# Patient Record
Sex: FEMALE | Race: WHITE | Employment: FULL TIME | URBAN - METROPOLITAN AREA
[De-identification: names, ages, dates, MRNs, and addresses within clinical notes are randomized per-mention and may not be internally consistent; named-entity substitution may affect disease eponyms.]

---

## 2019-07-29 ENCOUNTER — OFFICE VISIT (OUTPATIENT)
Dept: SURGERY | Age: 34
End: 2019-07-29

## 2019-07-29 VITALS
OXYGEN SATURATION: 99 % | HEART RATE: 89 BPM | WEIGHT: 218 LBS | BODY MASS INDEX: 40.12 KG/M2 | HEIGHT: 62 IN | SYSTOLIC BLOOD PRESSURE: 133 MMHG | DIASTOLIC BLOOD PRESSURE: 83 MMHG | TEMPERATURE: 97.6 F | RESPIRATION RATE: 16 BRPM

## 2019-07-29 DIAGNOSIS — I10 HYPERTENSION, UNSPECIFIED TYPE: ICD-10-CM

## 2019-07-29 DIAGNOSIS — Z96.41 INSULIN PUMP IN PLACE: ICD-10-CM

## 2019-07-29 DIAGNOSIS — J45.909 ASTHMA, UNSPECIFIED ASTHMA SEVERITY, UNSPECIFIED WHETHER COMPLICATED, UNSPECIFIED WHETHER PERSISTENT: ICD-10-CM

## 2019-07-29 DIAGNOSIS — E66.01 SEVERE OBESITY (BMI 35.0-39.9) WITH COMORBIDITY (HCC): ICD-10-CM

## 2019-07-29 DIAGNOSIS — K21.9 GASTROESOPHAGEAL REFLUX DISEASE, ESOPHAGITIS PRESENCE NOT SPECIFIED: ICD-10-CM

## 2019-07-29 DIAGNOSIS — M25.50 ARTHRALGIA, UNSPECIFIED JOINT: ICD-10-CM

## 2019-07-29 DIAGNOSIS — E10.9 TYPE 1 DIABETES MELLITUS WITHOUT COMPLICATION (HCC): ICD-10-CM

## 2019-07-29 DIAGNOSIS — Z87.891 SMOKING HISTORY: ICD-10-CM

## 2019-07-29 DIAGNOSIS — E66.01 SEVERE OBESITY (BMI 35.0-39.9) WITH COMORBIDITY (HCC): Primary | ICD-10-CM

## 2019-07-29 RX ORDER — OMEPRAZOLE 40 MG/1
40 CAPSULE, DELAYED RELEASE ORAL
COMMUNITY
Start: 2019-06-21 | End: 2020-06-20

## 2019-07-29 RX ORDER — ALBUTEROL SULFATE 2.5 MG/.5ML
2.5 SOLUTION RESPIRATORY (INHALATION)
COMMUNITY

## 2019-07-29 RX ORDER — NAPROXEN 500 MG/1
TABLET ORAL
Refills: 3 | COMMUNITY
Start: 2019-07-15

## 2019-07-29 RX ORDER — NYSTATIN AND TRIAMCINOLONE ACETONIDE 100000; 1 [USP'U]/G; MG/G
OINTMENT TOPICAL
COMMUNITY
Start: 2019-07-27

## 2019-07-29 RX ORDER — BLOOD SUGAR DIAGNOSTIC
STRIP MISCELLANEOUS
Refills: 6 | COMMUNITY
Start: 2019-06-07

## 2019-07-29 RX ORDER — SULFAMETHOXAZOLE AND TRIMETHOPRIM 800; 160 MG/1; MG/1
TABLET ORAL
COMMUNITY
Start: 2019-07-27

## 2019-07-29 RX ORDER — INSULIN ASPART 100 [IU]/ML
INJECTION, SOLUTION INTRAVENOUS; SUBCUTANEOUS
COMMUNITY

## 2019-07-29 RX ORDER — TIZANIDINE 2 MG/1
2 TABLET ORAL
COMMUNITY
Start: 2019-06-27

## 2019-07-29 RX ORDER — LANCETS 33 GAUGE
EACH MISCELLANEOUS
Refills: 6 | COMMUNITY
Start: 2019-05-20

## 2019-07-29 RX ORDER — VALSARTAN 80 MG/1
80 TABLET ORAL
COMMUNITY
Start: 2019-05-20

## 2019-07-29 RX ORDER — ONDANSETRON 4 MG/1
TABLET, ORALLY DISINTEGRATING ORAL
COMMUNITY
Start: 2019-05-23

## 2019-07-29 NOTE — PROGRESS NOTES
Osmar John presents today for   Chief Complaint   Patient presents with    New Patient     Pt is a New Patient       Is someone accompanying this pt? no    Is the patient using any DME equipment during OV? no    Depression Screening:  3 most recent PHQ Screens 7/29/2019   Little interest or pleasure in doing things Not at all   Feeling down, depressed, irritable, or hopeless Not at all   Total Score PHQ 2 0       Learning Assessment:  Learning Assessment 7/29/2019   PRIMARY LEARNER Patient   HIGHEST LEVEL OF EDUCATION - PRIMARY LEARNER  [address] [place] [address] PRIMARY LEARNER NONE   CO-LEARNER CAREGIVER No   PRIMARY LANGUAGE ENGLISH    NEED No   LEARNER PREFERENCE PRIMARY DEMONSTRATION   LEARNING SPECIAL TOPICS no   ANSWERED BY patient   RELATIONSHIP SELF       Abuse Screening:  Abuse Screening Questionnaire 7/29/2019   Do you ever feel afraid of your partner? N   Are you in a relationship with someone who physically or mentally threatens you? N   Is it safe for you to go home? Y       Fall Risk  No flowsheet data found. Coordination of Care:  1. Have you been to the ER, urgent care clinic since your last visit? Hospitalized since your last visit?no    2. Have you seen or consulted any other health care providers outside of the [address] [place] [address] since your last visit? Include any pap smears or colon screening.  no

## 2019-07-29 NOTE — PROGRESS NOTES
Bariatric Surgery Consultation    Subjective: The patient is a 29 y.o. obese female with a Body mass index is 39.87 kg/m². .  The patient is currently her heaviest weight for the past several years. she has been overweight since her teen years. she has been considering surgery since last year. she desires surgery at this time because of multiple health concerns and their lifestyle issues which are hindered by their weight. she has been referred by his family physician for evaluation and treatment of their obesity via surgical intervention. Mila Lam has tried multiple diets in her lifetime most recently tried behavior modification and unsupervised diets    Bariatric comorbidities present are   Patient Active Problem List   Diagnosis Code    Severe obesity (BMI 35.0-39. 9) with comorbidity ([address] E66.01    Diabetes mellitus ([address] E11.9    Asthma J45.909    GERD (gastroesophageal reflux disease) K21.9    Arthritic-like pain M25.50    Hypertension I10    Type 1 diabetes ([address] E10.9    Smoking history Z87.891    Insulin pump in place Z96.41       The patient is considering laparoscopic sleeve gastrectomy for surgical weight loss due to their ineffective progress with medical forms of weight loss and the urging of their physician who cares for their primary medical issues. The patient  now presents  for consideration for weight loss surgery understanding the benefits of this over a medical approach of weight loss as was discussed in our presentation on weight loss surgery. They have discussed their plans both with their family and primary care physician who is in support of their pursuit of such. The patient has had no health issues as of late and denies and gastrointestinal disturbances other than what is outlined below in their review of symptoms.  All of their prior evaluations available by both their PCP's and specialists physicians have been reviewed today either in the Care Everywhere portal or scanned under the media tab. I have spent a large portion of my initial consultation today reviewing the patients current dietary habits which have contributed to their health issues and obesity. I have suggested to them personally a dietary regimen that they can initiate now to help with their status as it pertains to their weight. They understand that the most important aspect of their journey through their weight loss endeavor will be their adherence to a new lifestyle of healthy eating behavior. They also understand that an adherence to an exercise program will not only help with weight loss but is ultimately important in weight maintenance. The patients goal weight is 147 lb. Patient Active Problem List    Diagnosis Date Noted    Severe obesity (BMI 35.0-39. 9) with comorbidity [facility]     Diabetes mellitus ([facility]     Asthma     GERD (gastroesophageal reflux disease)     Arthritic-like pain     Hypertension     Type 1 diabetes ([facility])     Smoking history     Insulin pump in place      Past Surgical History:   Procedure Laterality Date    DILATION AND CURETTAGE      HX CARPAL TUNNEL RELEASE Bilateral     HX  SECTION      HX TONSIL AND ADENOIDECTOMY      HX TUBAL LIGATION        Social History     Tobacco Use    Smoking status: Former Smoker     Types: Cigarettes     Last attempt to quit: 2012     Years since quittin.0    Smokeless tobacco: Never Used   Substance Use Topics    Alcohol use: Yes      Family History   Problem Relation Age of Onset    Thyroid Disease Mother     Celiac Disease Mother       Current Outpatient Medications   Medication Sig Dispense Refill    albuterol sulfate (PROVENTIL;VENTOLIN) 2.5 mg/0.5 mL nebu nebulizer solution Take 2.5 mg by inhalation.  glucose blood VI test strips (ASCENSIA AUTODISC VI, ONE TOUCH ULTRA TEST VI) strip Use TO TEST BLOOD SUGAR 4 TIMES DAILY      CONTOUR NEXT TEST STRIPS strip TEST BLOOD GLUCOSE 6 TIMES DAILY.  ON INSULIN PUMP.  6    insulin aspart U-100 (NOVOLOG) 100 unit/mL crtg by SubCUTAneous route.  lancets 33 gauge misc USE 4X /DAY  6    naproxen (NAPROSYN) 500 mg tablet TAKE 1 TABLET BY MOUTH TWICE A DAY  3    nystatin-triamcinolone (MYCOLOG) 100,000-0.1 unit/gram-% ointment       omeprazole (PRILOSEC) 40 mg capsule Take 40 mg by mouth.  ondansetron (ZOFRAN ODT) 4 mg disintegrating tablet       trimethoprim-sulfamethoxazole (BACTRIM DS, SEPTRA DS) 160-800 mg per tablet       tiZANidine (ZANAFLEX) 2 mg tablet Take 2 mg by mouth.  Acetone, Urine, Test strip Use as needed      valsartan (DIOVAN) 80 mg tablet Take 80 mg by mouth.        Allergies   Allergen Reactions    Tramadol Hives     Pt states throat closes, hives, itching  Other reaction(s): anaphylaxis/angioedema  Pt states throat closes, hives, itching  Other reaction(s): Facial Swelling  Other reaction(s): anaphylaxis/angioedema  Pt states throat closes, hives, itching          Review of Systems:     General - No history or complaints of unexpected fever, chills, or weight loss  Head/Neck - No history or complaints of headache, diplopia, dysphagia, hearing loss  Cardiac - No history or complaints of chest pain, palpitations, murmur, or shortness of breath  Pulmonary - No history or complaints of shortness of breath, productive cough, hemoptysis  Gastrointestinal - (+) reflux, no abdominal pain, obstipation/constipation or blood per rectum  Genitourinary - No history or complaints of hematuria/dysuria, stress urinary incontinence symptoms, or renal lithiasis  Musculoskeletal - mild joint pain in their knees,  no muscular weakness  Hematologic - No history or complaints of bleeding disorders,  No blood transfusions  Neurologic - No history or complaints of  migraine headaches, seizure activity, syncopal episodes, TIA or stroke  Integumentary - No history or complaints of rashes, abnormal nevi, skin cancer  Gynecological - regular menses without use of birth control    Objective:     Visit Vitals  /83 (BP 1 Location: Left arm, BP Patient Position: Sitting)   Pulse 89   Temp 97.6 °F (36.4 °C)   Resp 16   Ht 5' 2\" (1.575 m)   Wt 98.9 kg (218 lb)   SpO2 99%   BMI 39.87 kg/m²       Physical Examination: General appearance - alert, well appearing, and in no distress  Mental status - alert, oriented to person, place, and time  Eyes - pupils equal and reactive, extraocular eye movements intact  Ears - bilateral TM's and external ear canals normal  Nose - normal and patent, no erythema, discharge or polyps  Mouth - mucous membranes moist, pharynx normal without lesions  Neck - supple, no significant adenopathy  Lymphatics - no palpable lymphadenopathy, no hepatosplenomegaly  Chest - clear to auscultation, no wheezes, rales or rhonchi, symmetric air entry  Heart - normal rate, regular rhythm, normal S1, S2, no murmurs, rubs, clicks or gallops  Abdomen - soft, nontender, nondistended, no masses or organomegaly  Back exam - full range of motion, no tenderness, palpable spasm or pain on motion  Neurological - alert, oriented, normal speech, no focal findings or movement disorder noted  Musculoskeletal - no joint tenderness, deformity or swelling  Extremities - peripheral pulses normal, no pedal edema, no clubbing or cyanosis  Skin - normal coloration and turgor, no rashes, no suspicious skin lesions noted    Labs:       No results found for this or any previous visit (from the past 1440 hour(s)). Assessment:     Morbid obesity with comorbidity    Plan:     laparoscopic sleeve gastrectomy    This is a 29 y.o. female with a BMI of Body mass index is 39.87 kg/m². and the weight-related co-morbidties as noted below. Taya Parrish meets the NIH criteria for bariatric surgery based upon the BMI of Body mass index is 39.87 kg/m². and multiple weight-related co-morbidties.  Taya Parrish has elected laparoscopic sleeve gastrectomy as her intervention of choice for treatment of morbid obestiy through surgical means secondary to its safety profile, rapid return to work  and decreases in operative risks over gastric bypass. In the office today, following Pretty's history and physical examination, a 30 minute discussion regarding the anatomic alterations for the laparoscopic sleeve gastrectomy was undertaken. The dietary expectations and the patient and physician dependent factors for success were thoroughly discussed, to include the need for interval follow-up and long-term dietary changes associated with success. The possible complications of the sleeve gastrectomy  were also discussed, to include;death, DVT/PE, staple line leak, bleeding, stricture formation, infection, nutritional deficiencies and sleeve dilation. Specific weight related outcomes for success were also discussed with an emphasis on careful and close follow-up with the first year and eating behavior modification as the baseline and cyclical hunger return. The patient expressed an understanding of the above factors, and her questions were answered in their entirety. In addition, the patient attended a 1.5 hour power point seminar regarding obesity, surgical weight loss including, adjustable gastric band, gastric bypass, and sleeve gastrectomy. This discussion contrasted the different surgical techniques, mechanisms of actions and expected outcomes, and surgical and medical risks associated with each procedure. During this seminar, there was a long question and answer session where each questions was answered until there were no additional questions. Today, the patient had all of her questions answered and desires to proceed with  bariatric surgery initially choosing sleeve gastrectomy as her surgical option.     Secondary Diagnoses:     Dietary Intervention  - The patient is currently scheduled to see or has been followed by a bariatric nutritionist for an attempt at preoperative weight loss as has been dictated by their insurance carrier. They will be assessed at various times during their follow up to evaluate their progress depending on the length of time that is required once again by their carrier. I have explained the importance of preoperative weight loss and the benefits regarding lower surgical risk and also assisting the patient in reaching their weight loss goal.  Finally they understand their is a physiologic benefit from the standpoint of hepatic volume reduction preoperatively. I have reiterated the importance of a low carbohydrate and high protein regimen to achieve their stated goal.     GERD -The patient understands that weight loss surgery is not a guaranteed cure for reflux disease but does understand the benefits that weight loss can have on reflux disease. They also understand that at the time of surgery the gastroesophageal junction will be evaluated for the presence of a diaphragmatic hernia. Hernias will be corrected always with the gastric band and sleeve gastrectomy procedures, but only on a case by case basis with the gastric bypass if it prevents our ability to perform the operation at hand, or if I feel that they would benefit long term with correction of this issue. The patient also understands that neither weight loss surgery nor repair of a diaphragmatic hernia repair guarantees the complete cessation of the disease. They also understand there is a possibility of recurrence with a simple crural repair as is performed with these procedures. They understand they may have to continue their medications in the postoperative period. They have a good understanding that the gastric bypass procedure is better suited to total resolution of this issue and that neither the Lap Band nor sleeve gastrectomy is considered a curative procedure as it pertains to this diagnosis.     Adult Onset Diabetes - The patient has josep given a very low carbohydrate diet preoperatively along with instructions to monitor their blood sugars on a regular daily basis. When  their surgery is performed  we will be monitoring the patient with sliding scale insulin and accuchecks.  Based on those values we will determine whether the patient needs a reduction of those medications postoperatively or total removal of those medications on discharge.  We will have the patient continue accuchecks postoperatively while at home also and report to me or their family physician for appropriate adjustments as needed.  The patient also understands that in the event of uncontrolled blood sugar preoperatively that we may choose to postpone their surgery. Hypertension - The patient has a clear understanding of how weight loss improves hypertension as a whole, but also they understand that there is a significant genetic component to this disease process. We will monitor the patients blood pressure while in the hospital and the plan would be to continue those medications postoperatively.  If a diuretic is being used we will stop them on discharge to prevent dehydration particularly with the sleeve gastrectomy and the gastric bypass procedures.  They will be instructed to monitor their blood pressure postoperatively while at home and notify their primary care physician in the event of any significantly high or uncharacteristic readings.     Weight Related Arthritis -The patient understands the benefits that weight loss surgery can have on their arthritis but also understands that weight loss is not a guaranteed cure and relief of symptoms is often dependent on the severity of the underlying disease.  The patient also understands that traditional pharmaceutical treatments for this diagnosis are usually unavailable to post-operative weight loss patients due to the effects on the gastrointestinal tract particularly with the gastric bypass and to a lesser effect with the sleeve gastrectomy.  Any changes to the patients medication treatment will ultimately be made the patients PCP with input by our office. Restrictive Airway Disease - We will continue all of their pulmonary medications in the form of oral pills and inhalers in both the perioperative and postoperative period. They understand that their symptoms should improve with weight loss.  Any further testing related to this will be turned over to their family physician or pulmonologist.     Signed By: Leelee Bello MD     July 29, 2019

## 2019-07-29 NOTE — PATIENT INSTRUCTIONS
Body Mass Index: Care Instructions  Your Care Instructions    Body mass index (BMI) can help you see if your weight is raising your risk for health problems. It uses a formula to compare how much you weigh with how tall you are. · A BMI lower than 18.5 is considered underweight. · A BMI between 18.5 and 24.9 is considered healthy. · A BMI between 25 and 29.9 is considered overweight. A BMI of 30 or higher is considered obese. If your BMI is in the normal range, it means that you have a lower risk for weight-related health problems. If your BMI is in the overweight or obese range, you may be at increased risk for weight-related health problems, such as high blood pressure, heart disease, stroke, arthritis or joint pain, and diabetes. If your BMI is in the underweight range, you may be at increased risk for health problems such as fatigue, lower protection (immunity) against illness, muscle loss, bone loss, hair loss, and hormone problems. BMI is just one measure of your risk for weight-related health problems. You may be at higher risk for health problems if you are not active, you eat an unhealthy diet, or you drink too much alcohol or use tobacco products. Follow-up care is a key part of your treatment and safety. Be sure to make and go to all appointments, and call your doctor if you are having problems. It's also a good idea to know your test results and keep a list of the medicines you take. How can you care for yourself at home? · Practice healthy eating habits. This includes eating plenty of fruits, vegetables, whole grains, lean protein, and low-fat dairy. · If your doctor recommends it, get more exercise. Walking is a good choice. Bit by bit, increase the amount you walk every day. Try for at least 30 minutes on most days of the week. · Do not smoke. Smoking can increase your risk for health problems. If you need help quitting, talk to your doctor about stop-smoking programs and medicines. These can increase your chances of quitting for good. · Limit alcohol to 2 drinks a day for men and 1 drink a day for women. Too much alcohol can cause health problems. If you have a BMI higher than 25  · Your doctor may do other tests to check your risk for weight-related health problems. This may include measuring the distance around your waist. A waist measurement of more than 40 inches in men or 35 inches in women can increase the risk of weight-related health problems. · Talk with your doctor about steps you can take to stay healthy or improve your health. You may need to make lifestyle changes to lose weight and stay healthy, such as changing your diet and getting regular exercise. If you have a BMI lower than 18.5  · Your doctor may do other tests to check your risk for health problems. · Talk with your doctor about steps you can take to stay healthy or improve your health. You may need to make lifestyle changes to gain or maintain weight and stay healthy, such as getting more healthy foods in your diet and doing exercises to build muscle. Where can you learn more? Go to http://pravin-horace.info/. Enter S176 in the search box to learn more about \"Body Mass Index: Care Instructions. \"  Current as of: March 28, 2019  Content Version: 12.1  © 0044-3232 [employer]. Care instructions adapted under license by [employer] (which disclaims liability or warranty for this information). If you have questions about a medical condition or this instruction, always ask your healthcare professional. [place] [address] any warranty or liability for your use of this information. New patient Instructions      1. Ensure all pre-operative insurances requirements are complete (ie; dietary visits, psychology consults, primary care documentation, etc)    2. Adhere to pre-operative weight loss / weight maintenance plan discussed in the office today.     3. Contact the office with any questions on pre-operative clearance issues (ie; cardiology work-up, pulmonary work-up, upper GI study, etc). 4. If a barium upper GI study has been ordered for your evaluation, make sure you are on liquids only the morning of the procedure.

## 2019-08-21 ENCOUNTER — APPOINTMENT (OUTPATIENT)
Dept: GENERAL RADIOLOGY | Age: 34
End: 2019-08-21
Attending: SPECIALIST
Payer: MEDICAID

## 2019-08-21 ENCOUNTER — HOSPITAL ENCOUNTER (OUTPATIENT)
Dept: LAB | Age: 34
Discharge: HOME | End: 2019-08-21

## 2019-08-21 ENCOUNTER — HOSPITAL ENCOUNTER (OUTPATIENT)
Age: 34
Setting detail: OUTPATIENT SURGERY
Discharge: HOME | End: 2019-08-21
Attending: SPECIALIST | Admitting: SPECIALIST
Payer: MEDICAID

## 2019-08-21 VITALS
TEMPERATURE: 97.6 F | OXYGEN SATURATION: 98 % | DIASTOLIC BLOOD PRESSURE: 82 MMHG | BODY MASS INDEX: 41.17 KG/M2 | HEART RATE: 104 BPM | HEIGHT: 62 IN | WEIGHT: 223.7 LBS | SYSTOLIC BLOOD PRESSURE: 145 MMHG | RESPIRATION RATE: 16 BRPM

## 2019-08-21 DIAGNOSIS — E66.01 MORBID OBESITY (HCC): ICD-10-CM

## 2019-08-21 DIAGNOSIS — R93.3 ABNORMAL UPPER GASTROINTESTINAL BARIUM SERIES: Primary | ICD-10-CM

## 2019-08-21 LAB — XX-LABCORP SPECIMEN COL,LCBCF: NORMAL

## 2019-08-21 PROCEDURE — 74240 X-RAY XM UPR GI TRC 1CNTRST: CPT

## 2019-08-21 PROCEDURE — 99001 SPECIMEN HANDLING PT-LAB: CPT

## 2019-08-21 PROCEDURE — 77030040361 HC SLV COMPR DVT MDII -B: Performed by: SPECIALIST

## 2019-08-21 PROCEDURE — 74011000255 HC RX REV CODE- 255: Performed by: SPECIALIST

## 2019-08-21 PROCEDURE — 76040000019: Performed by: SPECIALIST

## 2019-08-21 NOTE — PROCEDURES
Patient:Pretty Bhagat   : 1985  Medical Record Number:[id number]            PREPROCEDURE DIAGNOSIS: This patient is preoperative for laparoscopic sleeve gastrectomyprocedure with a history of  reflux disease. POSTPROCEDURE DIAGNOSIS: This patient is preoperative for laparoscopic sleeve gastrectomyprocedure with a history of  reflux disease. PROCEDURES PERFORMED: Upper GI study with barium. ESTIMATED BLOOD LOSS: None. SPECIMENS: None. STATEMENT OF MEDICAL NECESSITY: The patient is a patient with a  longstanding history of obesity. They are now considering the laparoscopic sleeve gastrectomyprocedure as a means of surgical weight control and due to their history of reflux disease and are being assessed preoperatively for such. DESCRIPTION OF PROCEDURE: The patient was brought to the fluoroscopy unit and  was given thin barium. On swallowing of barium, they were noted to have  normal peristalsis of their esophagus. They had prompt filling of distal  esophagus with tapering into the gastroesophageal junction. There was no evidence of a hiatal hernia present. Contrast then filled the gastric cardia, fundus,body and pre pyloric region with no abnormalities noted. Contrast then exited the pylorus in normal fashion. No obstruction was noted. There was no evidence of reflux noted.     (abnormal study in DM1 pt suggestive of gastroparesis - needs GES)    Abraham Hernandez MD

## 2019-08-22 ENCOUNTER — CLINICAL SUPPORT (OUTPATIENT)
Dept: SURGERY | Age: 34
End: 2019-08-22

## 2019-08-22 VITALS — HEIGHT: 62 IN | BODY MASS INDEX: 41.41 KG/M2 | WEIGHT: 225 LBS

## 2019-08-22 DIAGNOSIS — E66.01 MORBID OBESITY (HCC): ICD-10-CM

## 2019-08-22 DIAGNOSIS — E10.9 TYPE 1 DIABETES MELLITUS WITHOUT COMPLICATION (HCC): Primary | ICD-10-CM

## 2019-08-22 NOTE — PROGRESS NOTES
Medical Weight Loss Multi-Disciplinary Program    Name: Rene Wood   : 1985    Session# 1  Date: 2019     Height: 5' 2\" (157.5 cm)    Weight: 102.1 kg (225 lb) lbs. Body mass index is 41.15 kg/m². Pounds Gained: 7    Patient is a type I diabetic. Normal range is    A1c was running between 6-8 and has jumped up to 10   Has had a hard time keeping her BGs under control     Dietary Instructions    Reviewed intake  Understanding label reading  Understanding low carbohydrates, low sugar, higher protein meals  Understanding proper portions  Dining outside home  Instruction given for personal dietary changes  Discussed perceived compliance  Comments: Pt given brief pre/post-op diet ed and diet hx reviewed. Physical Activity/Exercise    Discussed Perceived Compliance  Reasonable Goals Set  Motivation  Comments: none    Behavior Modification    Positive attitude  Comments: Pt is working on the following goals:    Candidate for surgery (per RD): PENDING     Dietitian: Tiera Bailey is a 29 y.o. female who present for a pre-op evaluation. Visit Vitals  Ht 5' 2\" (1.575 m)   Wt 102.1 kg (225 lb)   BMI 41.15 kg/m²     Past Medical History:   Diagnosis Date    Arthritic-like pain     Asthma     Diabetes mellitus ([address]     Type 1 - Dx  at age 9    GERD (gastroesophageal reflux disease)     uses PPI    Hypertension     Insulin pump in place     Severe obesity (BMI 35.0-39. 9) with comorbidity ([address]     Smoking history     quit     Type 1 diabetes ([address]     started on insulin pump  / sees [facility]           Procedure:  laparoscopic sleeve gastrectomy     Reasons for Surgery:  BMI > 40 with one or more medically significant comorbidities    Summary:  Pt given brief pre/post-op diet ed and diet hx reviewed. Pt instructed to follow a low calorie, low carbohydrate, high protein diet of about 3182-4009 calories daily. Pt set several goals.  See below.     Current Vitamins: vitamin d (prescription) and vitamin C, probiotics, hair bear gummies (has iron and biotin)     What have you done in the past to try to lose weight? Has done/tried: fasting, nutrisystem, weight watchers, currently doing [employer], [employer]     Why didn't you lose weight or keep the weight off?: the patient has been able to lose weight in the past but cannot keep it off long term     Why do you think having weight loss surgery will make it possible for you to lose weight and keep it off? No matter what the patient has tried, she cannot stop gaining weight and always feels hungry and needs something to help decrease that feeling       Patient Education and Materials Provided:  Supplement Resource Guide, B Vitamin Information, MVI Recommendations, Calcium Citrate Information, Bariatric Supplement Companies, Protein Supplement Information, Fluid Requirements, No Caffeine or Carbonation, No Alcohol for One Year Post Op, 3 Balanced Meals a Day, Food Group Guide, Good Choices Dining Out, No Snacks, No Concentrated Sweets, Support System at Home, Exercising, Support Group Information and Addressed Current Habits / Changes to make    Nutritional Hx: What is the number of meals you eat per day? 3  Comment: n/a     Do you eat between meals / snack? Sometimes, but not usually   Typical snack: peanut butter and apples, handful of chips, popcorn     How fast do you eat your meals? Very slow     How often do you eat fast food? patient is a  at the [facility] and eats there daily     How many sodas/sugared beverages do you drink per day? Not drinking soda - may have a diet coke 1-2 times a week     How many caffeinated drinks do you have per day? Was drinking diet cokes, but not anymore     How much milk and/or juice do you drink per day? Milk - skim milk - juice - very rare    How much water do you drink per day? At least gallon a day  (8oz glasses)    How often do you consume alcohol? socially;     Diet History:  Breakfast  What are you eating and how much? Special K protein cereal with skim milk, on the weekends eggs, eugene and toast OR microwave sausage biscuits            Snacks  What are you eating and how much? None            Hydration  What are you eating and how much? water           Lunch  What are you eating and how much? [place], sometimes apples and carrots - doesn't eat a lot during the day            Snacks  What are you eating and how much? Sometimes - apples with peanut butter, 1/2 pbj, popcorn or chips            Hydration  What are you eating and how much? Water            Dinner  What are you eating and how much? Chicken and vegetables with rice/mashed potatoes, spaghetti, tacos - always a protein vegetable and starch            Snacks  What are you eating and how much? Worst part of her day - gets very hungry at night time right before she does to bed - chips and a sandwich - usually another meal            Hydration  What are you eating and how much? water             Exercise:  Do you currently have an exercise routine? no and does not due to being asthmatic and a previous smoker, is currently experiencing back and knee problems - chronic pain     Goals:   1. Continue following your PT/orthopedic exercises and continue walking 10 miles a day at your job     2. Continue your current daily vitamins - add in a daily MVI - we recommend the flint stone's complete chewable (hard texture, kid's version, no gummy) once a day now - it'll increase to twice a day after surgery for life. 3. Continue to eat three meals a day with two snacks - focusing on protein (100 grams a day), non-starchy vegetables and 100 -120 grams of carbohydrates a day (30 grams per meal and 15 grams per snack) - focus on complex carbohydrates - always pair your carbohydrate with a protein    4.  Look for a protein shake you can use as a meal replacement or snack - using supplement guide in nutrition folder

## 2019-08-22 NOTE — PATIENT INSTRUCTIONS
Goals: 1. Continue following your PT/orthopedic exercises and continue walking 10 miles a day at your job     2. Continue your current daily vitamins - add in a daily MVI - we recommend the flint stone's complete chewable (hard texture, kid's version, no gummy) once a day now - it'll increase to twice a day after surgery for life. 3. Continue to eat three meals a day with two snacks - focusing on protein (100 grams a day), non-starchy vegetables and 100 -120 grams of carbohydrates a day (30 grams per meal and 15 grams per snack) - focus on complex carbohydrates - always pair your carbohydrate with a protein    4.  Look for a protein shake you can use as a meal replacement or snack - using supplement guide in nutrition folder

## 2019-08-23 LAB
H PYLORI IGA SER-ACNC: 15.3 UNITS (ref 0–8.9)
H PYLORI IGG SER IA-ACNC: <0.8 INDEX VALUE (ref 0–0.79)
TSH SERPL DL<=0.005 MIU/L-ACNC: 1.94 UIU/ML (ref 0.45–4.5)

## 2019-08-27 ENCOUNTER — TELEPHONE (OUTPATIENT)
Dept: SURGERY | Age: 34
End: 2019-08-27

## 2019-08-27 DIAGNOSIS — A04.8 H. PYLORI INFECTION: Primary | ICD-10-CM

## 2019-08-27 RX ORDER — AMOXICILLIN 500 MG/1
1000 CAPSULE ORAL 2 TIMES DAILY
Qty: 56 CAP | Refills: 0 | Status: SHIPPED | OUTPATIENT
Start: 2019-08-27 | End: 2019-09-10

## 2019-08-27 RX ORDER — CLARITHROMYCIN 500 MG/1
500 TABLET, FILM COATED ORAL 2 TIMES DAILY
Qty: 28 TAB | Refills: 0 | Status: SHIPPED | OUTPATIENT
Start: 2019-08-27 | End: 2019-09-10

## 2019-08-27 RX ORDER — OMEPRAZOLE 20 MG/1
20 CAPSULE, DELAYED RELEASE ORAL
Qty: 28 CAP | Refills: 0 | Status: SHIPPED | OUTPATIENT
Start: 2019-08-27 | End: 2019-09-10

## 2019-08-27 NOTE — TELEPHONE ENCOUNTER
I explained to patient that she has an H pylori infection and that I was sending triple therapy to her pharmacy. Patient verbalized understanding. .    Patient is to contact our office with any problems, worsening of condition, questions or concerns. If we are not available or unable to be reached, patient is to proceed to the Emergency Department.

## 2019-09-17 ENCOUNTER — HOSPITAL ENCOUNTER (OUTPATIENT)
Dept: NUCLEAR MEDICINE | Age: 34
Discharge: HOME | End: 2019-09-17
Attending: SPECIALIST
Payer: MEDICAID

## 2019-09-17 DIAGNOSIS — R93.3 ABNORMAL UPPER GASTROINTESTINAL BARIUM SERIES: ICD-10-CM

## 2019-09-17 PROCEDURE — 78264 GASTRIC EMPTYING IMG STUDY: CPT

## (undated) DEVICE — GARMENT,MEDLINE,DVT,INT,CALF,MED, GEN2: Brand: MEDLINE

## (undated) DEVICE — STERILE POLYISOPRENE POWDER-FREE SURGICAL GLOVES: Brand: PROTEXIS

## (undated) DEVICE — STRAP,POSITIONING,KNEE/BODY,FOAM,4X60": Brand: MEDLINE

## (undated) DEVICE — MAJ-1414 SINGLE USE ADPATER BIOPSY VALV: Brand: SINGLE USE ADAPTOR BIOPSY VALVE

## (undated) DEVICE — GOWN ISOLATN REG BLU POLY UNISX W/ THMB LOOP